# Patient Record
Sex: MALE | Race: WHITE | HISPANIC OR LATINO | URBAN - METROPOLITAN AREA
[De-identification: names, ages, dates, MRNs, and addresses within clinical notes are randomized per-mention and may not be internally consistent; named-entity substitution may affect disease eponyms.]

---

## 2022-12-01 ENCOUNTER — APPOINTMENT (RX ONLY)
Dept: URBAN - METROPOLITAN AREA CLINIC 15 | Facility: CLINIC | Age: 62
Setting detail: DERMATOLOGY
End: 2022-12-01

## 2022-12-01 VITALS — HEIGHT: 71 IN | WEIGHT: 170 LBS

## 2022-12-01 DIAGNOSIS — L71.8 OTHER ROSACEA: ICD-10-CM

## 2022-12-01 DIAGNOSIS — L21.8 OTHER SEBORRHEIC DERMATITIS: ICD-10-CM

## 2022-12-01 DIAGNOSIS — L20.89 OTHER ATOPIC DERMATITIS: ICD-10-CM

## 2022-12-01 DIAGNOSIS — L82.1 OTHER SEBORRHEIC KERATOSIS: ICD-10-CM

## 2022-12-01 PROBLEM — L20.84 INTRINSIC (ALLERGIC) ECZEMA: Status: ACTIVE | Noted: 2022-12-01

## 2022-12-01 PROCEDURE — ? COUNSELING

## 2022-12-01 PROCEDURE — ? PRESCRIPTION

## 2022-12-01 PROCEDURE — 99204 OFFICE O/P NEW MOD 45 MIN: CPT

## 2022-12-01 PROCEDURE — ? PRESCRIPTION MEDICATION MANAGEMENT

## 2022-12-01 RX ORDER — BETAMETHASONE DIPROPIONATE 0.5 MG/G
1 CREAM TOPICAL BID
Qty: 60 | Refills: 1 | Status: ERX | COMMUNITY
Start: 2022-12-01

## 2022-12-01 RX ORDER — METRONIDAZOLE 7.5 MG/G
GEL TOPICAL QD
Qty: 45 | Refills: 2 | Status: ERX | COMMUNITY
Start: 2022-12-01

## 2022-12-01 RX ORDER — METRONIDAZOLE 7.5 MG/G
1 CREAM TOPICAL QD
Qty: 45 | Refills: 5 | Status: ERX | COMMUNITY
Start: 2022-12-01

## 2022-12-01 RX ORDER — MUPIROCIN 20 MG/G
1 OINTMENT TOPICAL
Qty: 22 | Refills: 0 | Status: ERX | COMMUNITY
Start: 2022-12-01

## 2022-12-01 RX ADMIN — METRONIDAZOLE 1: 7.5 CREAM TOPICAL at 00:00

## 2022-12-01 RX ADMIN — BETAMETHASONE DIPROPIONATE 1: 0.5 CREAM TOPICAL at 00:00

## 2022-12-01 RX ADMIN — MUPIROCIN 1: 20 OINTMENT TOPICAL at 00:00

## 2022-12-01 RX ADMIN — METRONIDAZOLE: 7.5 GEL TOPICAL at 00:00

## 2022-12-01 ASSESSMENT — LOCATION SIMPLE DESCRIPTION DERM
LOCATION SIMPLE: LEFT CHEEK
LOCATION SIMPLE: RIGHT TEMPLE
LOCATION SIMPLE: RIGHT EAR
LOCATION SIMPLE: RIGHT CHEEK
LOCATION SIMPLE: LEFT FOREHEAD
LOCATION SIMPLE: CHIN
LOCATION SIMPLE: RIGHT CHEST
LOCATION SIMPLE: RIGHT INDEX FINGER
LOCATION SIMPLE: LEFT EAR

## 2022-12-01 ASSESSMENT — LOCATION DETAILED DESCRIPTION DERM
LOCATION DETAILED: RIGHT INFERIOR CENTRAL MALAR CHEEK
LOCATION DETAILED: RIGHT LATERAL TEMPLE
LOCATION DETAILED: RIGHT INFERIOR POSTERIOR HELIX
LOCATION DETAILED: RIGHT PROXIMAL DORSAL INDEX FINGER
LOCATION DETAILED: LEFT POSTERIOR EAR
LOCATION DETAILED: LEFT INFERIOR CENTRAL MALAR CHEEK
LOCATION DETAILED: LEFT SUPERIOR MEDIAL MALAR CHEEK
LOCATION DETAILED: RIGHT CHIN
LOCATION DETAILED: LEFT MEDIAL FOREHEAD
LOCATION DETAILED: RIGHT SUPERIOR MEDIAL BUCCAL CHEEK
LOCATION DETAILED: RIGHT AREOLA

## 2022-12-01 ASSESSMENT — LOCATION ZONE DERM
LOCATION ZONE: EAR
LOCATION ZONE: FINGER
LOCATION ZONE: FACE
LOCATION ZONE: TRUNK

## 2022-12-01 NOTE — PROCEDURE: COUNSELING
Detail Level: Simple
Detail Level: Zone
Moisturizer Recommendations: La Roche Posay Double repair moisturizer
Sunscreen Recommendations: SPF [address] [place] or higher
Ipl Recommendations: IPl face x 3 treatments
Topical Retinoids Recommendations: Retinol
Moisturizer Recommendations: Double repair moisturizer
Cleanser Recommendations: Avar cleanser
Shampoo Recommendations: Ketoconazole shampoo
Topical Steroid Recommendations: HCT
Moisturizer Recommendations: Lipikar moisturizer from [employer]

## 2022-12-01 NOTE — HPI: RASH (ROSACEA)
Is This A New Presentation, Or A Follow-Up?: Rosacea
Additional History: Patient states he has used Metronidazole for rosacea as well as Plexion for associated seb derm. He uses the metro gel around the nose only. Which helps .

## 2022-12-01 NOTE — PROCEDURE: PRESCRIPTION MEDICATION MANAGEMENT
Initiate Treatment: Bernardino Francois \\nWash\\nUse metronidazole gel in the am\\nUse metronidazole cream pm on a clean face. Rx sent  \\nUse sun screen daily in the am. Reapply every 3-4 hours. La Roche Posay mineral sun screen \\nUse daily moisturizer.  LaRoche posay double repair moisturizer
Render In Strict Bullet Format?: No
Detail Level: Zone
Initiate Treatment: .\\n\\n-betamethasone dipropionate 0.05 % topical cream . Apply to the affected areas of the body twice a day for two weeks use as needed for flares. Rx sent \\n\\n-mupirocin 2 % topical ointment . Apply to all open sore areas of the body am and pm as needed
Initiate Treatment: .\\n\\nbetamethasone dipropionate 0.05 % topical cream . Apply to the affected areas of the face and ears  twice a day for two weeks use as needed for flares. Rx sent \\n.